# Patient Record
Sex: MALE | Race: WHITE | NOT HISPANIC OR LATINO | Employment: UNEMPLOYED | URBAN - NONMETROPOLITAN AREA
[De-identification: names, ages, dates, MRNs, and addresses within clinical notes are randomized per-mention and may not be internally consistent; named-entity substitution may affect disease eponyms.]

---

## 2021-01-24 ENCOUNTER — HISTORICAL (OUTPATIENT)
Dept: ADMINISTRATIVE | Facility: HOSPITAL | Age: 56
End: 2021-01-24

## 2021-06-24 ENCOUNTER — HISTORICAL (OUTPATIENT)
Dept: RADIOLOGY | Facility: HOSPITAL | Age: 56
End: 2021-06-24

## 2022-09-14 ENCOUNTER — HOSPITAL ENCOUNTER (EMERGENCY)
Facility: HOSPITAL | Age: 57
Discharge: HOME | End: 2022-09-15
Attending: INTERNAL MEDICINE
Payer: MEDICAID

## 2022-09-14 DIAGNOSIS — R60.0 BILATERAL LOWER EXTREMITY EDEMA: ICD-10-CM

## 2022-09-14 DIAGNOSIS — F19.10 POLYSUBSTANCE ABUSE: ICD-10-CM

## 2022-09-14 DIAGNOSIS — R60.0 PERIPHERAL EDEMA: Primary | ICD-10-CM

## 2022-09-14 DIAGNOSIS — M79.89 LEG SWELLING: ICD-10-CM

## 2022-09-14 PROCEDURE — 99283 EMERGENCY DEPT VISIT LOW MDM: CPT | Mod: 25

## 2022-09-15 VITALS
HEIGHT: 73 IN | OXYGEN SATURATION: 99 % | DIASTOLIC BLOOD PRESSURE: 92 MMHG | BODY MASS INDEX: 31.38 KG/M2 | HEART RATE: 82 BPM | RESPIRATION RATE: 18 BRPM | WEIGHT: 236.75 LBS | TEMPERATURE: 98 F | SYSTOLIC BLOOD PRESSURE: 144 MMHG

## 2022-09-15 LAB
ALBUMIN SERPL-MCNC: 3.5 GM/DL (ref 3.5–5)
ALBUMIN/GLOB SERPL: 0.9 RATIO (ref 1.1–2)
ALP SERPL-CCNC: 90 UNIT/L (ref 40–150)
ALT SERPL-CCNC: 29 UNIT/L (ref 0–55)
AMPHET UR QL SCN: NEGATIVE
APPEARANCE UR: CLEAR
AST SERPL-CCNC: 25 UNIT/L (ref 5–34)
BARBITURATE SCN PRESENT UR: NEGATIVE
BASOPHILS # BLD AUTO: 0.03 X10(3)/MCL (ref 0–0.2)
BASOPHILS NFR BLD AUTO: 0.4 %
BENZODIAZ UR QL SCN: NEGATIVE
BILIRUB UR QL STRIP.AUTO: NEGATIVE MG/DL
BILIRUBIN DIRECT+TOT PNL SERPL-MCNC: 0.4 MG/DL
BNP BLD-MCNC: <10 PG/ML
BUN SERPL-MCNC: 10 MG/DL (ref 8.4–25.7)
CALCIUM SERPL-MCNC: 9.2 MG/DL (ref 8.4–10.2)
CANNABINOIDS UR QL SCN: POSITIVE
CHLORIDE SERPL-SCNC: 104 MMOL/L (ref 98–107)
CO2 SERPL-SCNC: 21 MMOL/L (ref 22–29)
COCAINE UR QL SCN: POSITIVE
COLOR UR AUTO: ABNORMAL
CREAT SERPL-MCNC: 0.72 MG/DL (ref 0.73–1.18)
EOSINOPHIL # BLD AUTO: 0.53 X10(3)/MCL (ref 0–0.9)
EOSINOPHIL NFR BLD AUTO: 7.5 %
ERYTHROCYTE [DISTWIDTH] IN BLOOD BY AUTOMATED COUNT: 13 % (ref 11.5–17)
FENTANYL UR QL SCN: POSITIVE
GFR SERPLBLD CREATININE-BSD FMLA CKD-EPI: >60 MLS/MIN/1.73/M2
GLOBULIN SER-MCNC: 3.9 GM/DL (ref 2.4–3.5)
GLUCOSE SERPL-MCNC: 111 MG/DL (ref 74–100)
GLUCOSE UR QL STRIP.AUTO: NEGATIVE MG/DL
HCT VFR BLD AUTO: 41.4 % (ref 42–52)
HGB BLD-MCNC: 13.7 GM/DL (ref 14–18)
IMM GRANULOCYTES # BLD AUTO: 0.02 X10(3)/MCL (ref 0–0.04)
IMM GRANULOCYTES NFR BLD AUTO: 0.3 %
KETONES UR QL STRIP.AUTO: NEGATIVE MG/DL
LEUKOCYTE ESTERASE UR QL STRIP.AUTO: NEGATIVE UNIT/L
LYMPHOCYTES # BLD AUTO: 1.52 X10(3)/MCL (ref 0.6–4.6)
LYMPHOCYTES NFR BLD AUTO: 21.5 %
MCH RBC QN AUTO: 31.3 PG (ref 27–31)
MCHC RBC AUTO-ENTMCNC: 33.1 MG/DL (ref 33–36)
MCV RBC AUTO: 94.5 FL (ref 80–94)
MDMA UR QL SCN: NEGATIVE
MONOCYTES # BLD AUTO: 0.83 X10(3)/MCL (ref 0.1–1.3)
MONOCYTES NFR BLD AUTO: 11.7 %
NEUTROPHILS # BLD AUTO: 4.1 X10(3)/MCL (ref 2.1–9.2)
NEUTROPHILS NFR BLD AUTO: 58.6 %
NITRITE UR QL STRIP.AUTO: NEGATIVE
OPIATES UR QL SCN: POSITIVE
PCP UR QL: NEGATIVE
PH UR STRIP.AUTO: 6 [PH]
PH UR: 6 [PH] (ref 3–11)
PLATELET # BLD AUTO: 303 X10(3)/MCL (ref 130–400)
PMV BLD AUTO: 10.4 FL (ref 7.4–10.4)
POTASSIUM SERPL-SCNC: 4.1 MMOL/L (ref 3.5–5.1)
PROT SERPL-MCNC: 7.4 GM/DL (ref 6.4–8.3)
PROT UR QL STRIP.AUTO: NEGATIVE MG/DL
RBC # BLD AUTO: 4.38 X10(6)/MCL (ref 4.7–6.1)
RBC UR QL AUTO: NEGATIVE UNIT/L
SODIUM SERPL-SCNC: 136 MMOL/L (ref 136–145)
SP GR UR STRIP.AUTO: >=1.03
SPECIFIC GRAVITY, URINE AUTO (.000) (OHS): >=1.03 (ref 1–1.03)
UROBILINOGEN UR STRIP-ACNC: 2 MG/DL
WBC # SPEC AUTO: 7.1 X10(3)/MCL (ref 4.5–11.5)

## 2022-09-15 PROCEDURE — 81003 URINALYSIS AUTO W/O SCOPE: CPT | Mod: 59 | Performed by: INTERNAL MEDICINE

## 2022-09-15 PROCEDURE — 83880 ASSAY OF NATRIURETIC PEPTIDE: CPT | Performed by: INTERNAL MEDICINE

## 2022-09-15 PROCEDURE — 36415 COLL VENOUS BLD VENIPUNCTURE: CPT | Performed by: INTERNAL MEDICINE

## 2022-09-15 PROCEDURE — 80307 DRUG TEST PRSMV CHEM ANLYZR: CPT | Performed by: INTERNAL MEDICINE

## 2022-09-15 PROCEDURE — 80053 COMPREHEN METABOLIC PANEL: CPT | Performed by: INTERNAL MEDICINE

## 2022-09-15 PROCEDURE — 85025 COMPLETE CBC W/AUTO DIFF WBC: CPT | Performed by: INTERNAL MEDICINE

## 2022-09-15 NOTE — ED PROVIDER NOTES
Encounter Date: 9/14/2022       History     Chief Complaint   Patient presents with    Leg Swelling     Pt c/o catrina lower leg pain and swelling x 10 days.     57-year-old white male reports that since he got out of [facility] a couple of weeks ago his legs began to swell and no matter what he does he continues to have swelling in both lower extremities.    Review of patient's allergies indicates:  No Known Allergies  History reviewed. No pertinent past medical history.  No past surgical history on file.  History reviewed. No pertinent family history.     Review of Systems   Constitutional: Negative.  Negative for activity change, appetite change, chills, diaphoresis, fatigue, fever and unexpected weight change.   HENT: Negative.  Negative for congestion, dental problem, drooling, ear discharge, ear pain, facial swelling, hearing loss, mouth sores, nosebleeds, postnasal drip, rhinorrhea, sinus pressure, sinus pain, sneezing, sore throat, tinnitus, trouble swallowing and voice change.    Eyes: Negative.  Negative for photophobia, pain, discharge, redness, itching and visual disturbance.   Respiratory: Negative.  Negative for apnea, cough, choking, chest tightness, shortness of breath, wheezing and stridor.    Cardiovascular:  Positive for leg swelling. Negative for chest pain and palpitations.   Gastrointestinal: Negative.  Negative for abdominal distention, abdominal pain, anal bleeding, blood in stool, constipation, diarrhea, nausea, rectal pain and vomiting.   Endocrine: Negative.  Negative for cold intolerance, heat intolerance, polydipsia, polyphagia and polyuria.   Genitourinary: Negative.  Negative for decreased urine volume, difficulty urinating, dysuria, enuresis, flank pain, frequency, genital sores, hematuria, penile discharge, penile pain, penile swelling, scrotal swelling, testicular pain and urgency.   Musculoskeletal: Negative.  Negative for arthralgias, back pain, gait problem, joint swelling, myalgias,  neck pain and neck stiffness.   Skin: Negative.  Negative for color change, pallor, rash and wound.   Allergic/Immunologic: Negative.  Negative for environmental allergies, food allergies and immunocompromised state.   Neurological: Negative.  Negative for dizziness, tremors, seizures, syncope, facial asymmetry, speech difficulty, weakness, light-headedness, numbness and headaches.   Hematological: Negative.  Negative for adenopathy. Does not bruise/bleed easily.   Psychiatric/Behavioral: Negative.  Negative for agitation, behavioral problems, confusion, decreased concentration, dysphoric mood, hallucinations, self-injury, sleep disturbance and suicidal ideas. The patient is not nervous/anxious and is not hyperactive.    All other systems reviewed and are negative.    Physical Exam     Initial Vitals [09/14/22 2348]   BP Pulse Resp Temp SpO2   (!) 144/92 98 20 98 °F (36.7 °C) 98 %      MAP       --         Physical Exam    Nursing note and vitals reviewed.  Constitutional: He appears well-developed and well-nourished.   HENT:   Head: Normocephalic and atraumatic.   Eyes: Conjunctivae and EOM are normal. Pupils are equal, round, and reactive to light.   Neck: Neck supple.   Normal range of motion.  Cardiovascular:  Normal rate and regular rhythm.           1+ distal edema with hair loss and skin changes in the pretibial area down to the ankle consistent with chronic venous stasis   Pulmonary/Chest: Breath sounds normal.   Abdominal: Abdomen is soft. Bowel sounds are normal.   Musculoskeletal:         General: Edema present. Normal range of motion.      Cervical back: Normal range of motion and neck supple.     Neurological: He is alert and oriented to person, place, and time.   Skin: Skin is warm and dry. Capillary refill takes less than 2 seconds.   Psychiatric: He has a normal mood and affect. His behavior is normal. Judgment and thought content normal.       ED Course   Procedures  Labs Reviewed   COMPREHENSIVE  METABOLIC PANEL - Abnormal; Notable for the following components:       Result Value    Carbon Dioxide 21 (*)     Glucose Level 111 (*)     Creatinine 0.72 (*)     Globulin 3.9 (*)     Albumin/Globulin Ratio 0.9 (*)     All other components within normal limits   DRUG SCREEN, URINE (BEAKER) - Abnormal; Notable for the following components:    Cannabinoids, Urine Positive (*)     Cocaine, Urine Positive (*)     Fentanyl, Urine Positive (*)     Opiates, Urine Positive (*)     All other components within normal limits    Narrative:     Cut off concentrations:    Amphetamines - 1000 ng/ml  Barbiturates - 200 ng/ml  Benzodiazepine - 200 ng/ml  Cannabinoids (THC) - 50 ng/ml  Cocaine - 300 ng/ml  Fentanyl - 1.0 ng/ml  MDMA - 500 ng/ml  Opiates - 300 ng/ml   Phencyclidine (PCP) - 25 ng/ml    Specimen submitted for drug analysis and tested for pH and specific gravity in order to evaluate sample integrity. Suspect tampering if specific gravity is <1.003 and/or pH is not within the range of 4.5 - 8.0  False negatives may result form substances such as bleach added to urine.  False positives may result for the presence of a substance with similar chemical structure to the drug or its metabolite.    This test provides only a PRELIMINARY analytical test result. A more specific alternate chemical method must be used in order to obtain a confirmed analytical result. Gas chromatography/mass spectrometry (GC/MS) is the preferred confirmatory method. Other chemical confirmation methods are available. Clinical consideration and professional judgement should be applied to any drug of abuse test result, particularly when preliminary positive results are used.    Positive results will be confirmed only at the physicians request. Unconfirmed screening results are to be used only for medical purposes (treatment).        URINALYSIS, REFLEX TO URINE CULTURE - Abnormal; Notable for the following components:    Color, UA Dark Yellow (*)      Urobilinogen, UA 2.0 (*)     All other components within normal limits   CBC WITH DIFFERENTIAL - Abnormal; Notable for the following components:    RBC 4.38 (*)     Hgb 13.7 (*)     Hct 41.4 (*)     MCV 94.5 (*)     MCH 31.3 (*)     All other components within normal limits   B-TYPE NATRIURETIC PEPTIDE - Normal   CBC W/ AUTO DIFFERENTIAL    Narrative:     The following orders were created for panel order CBC auto differential.  Procedure                               Abnormality         Status                     ---------                               -----------         ------                     CBC with Differential[[id number]]        Abnormal            Final result                 Please view results for these tests on the individual orders.          Imaging Results    None          Medications - No data to display              ED Course as of 09/15/22 0131   Thu Sep 15, 2022   0129 Discussed findings with him and his friend in the room and following a low-salt diet originally he states he does not eat salt however when I asked him what he ate yesterday it was fast food from [employer] in the morning lunch and dinner time.  He states since [facility] where there was no salt on his food at all he has been eating a lot of fast food and I explained to him that this is contributing to his peripheral edema, that and his polysubstance abuse [PL]      ED Course User Index  [PL] Blair Cooper MD                 Clinical Impression:   Final diagnoses:  [R60.0] Bilateral lower extremity edema  [M79.89] Leg swelling  [R60.9] Peripheral edema (Primary)  [F19.10] Polysubstance abuse      ED Disposition Condition    Discharge Stable          ED Prescriptions    None       Follow-up Information       Follow up With Specialties Details Why Contact Info    Primary care physician  In 3 days            Radha Acuna is a certified MA and was present during the entire interaction with this patient        Blair Cooper MD  09/15/22 0139

## 2024-07-27 ENCOUNTER — HOSPITAL ENCOUNTER (EMERGENCY)
Facility: HOSPITAL | Age: 59
Discharge: HOME | End: 2024-07-27
Attending: EMERGENCY MEDICINE
Payer: MEDICAID

## 2024-07-27 VITALS
BODY MASS INDEX: 24.49 KG/M2 | RESPIRATION RATE: 16 BRPM | SYSTOLIC BLOOD PRESSURE: 113 MMHG | WEIGHT: 197 LBS | HEIGHT: 75 IN | HEART RATE: 72 BPM | DIASTOLIC BLOOD PRESSURE: 72 MMHG | TEMPERATURE: 98 F | OXYGEN SATURATION: 95 %

## 2024-07-27 DIAGNOSIS — S62.629B OPEN AVULSION FRACTURE OF MIDDLE PHALANX OF FINGER, INITIAL ENCOUNTER: ICD-10-CM

## 2024-07-27 DIAGNOSIS — S61.212A LACERATION OF RIGHT MIDDLE FINGER WITHOUT FOREIGN BODY WITHOUT DAMAGE TO NAIL, INITIAL ENCOUNTER: Primary | ICD-10-CM

## 2024-07-27 PROCEDURE — 99284 EMERGENCY DEPT VISIT MOD MDM: CPT | Mod: 25

## 2024-07-27 PROCEDURE — 90471 IMMUNIZATION ADMIN: CPT | Performed by: PHYSICIAN ASSISTANT

## 2024-07-27 PROCEDURE — 63600175 PHARM REV CODE 636 W HCPCS: Performed by: PHYSICIAN ASSISTANT

## 2024-07-27 PROCEDURE — 96372 THER/PROPH/DIAG INJ SC/IM: CPT | Performed by: PHYSICIAN ASSISTANT

## 2024-07-27 PROCEDURE — 25000003 PHARM REV CODE 250: Performed by: PHYSICIAN ASSISTANT

## 2024-07-27 PROCEDURE — 12041 INTMD RPR N-HF/GENIT 2.5CM/<: CPT

## 2024-07-27 PROCEDURE — 90715 TDAP VACCINE 7 YRS/> IM: CPT | Performed by: PHYSICIAN ASSISTANT

## 2024-07-27 RX ORDER — KETOROLAC TROMETHAMINE 10 MG/1
10 TABLET, FILM COATED ORAL EVERY 6 HOURS
Qty: 20 TABLET | Refills: 0 | Status: SHIPPED | OUTPATIENT
Start: 2024-07-27 | End: 2024-08-01

## 2024-07-27 RX ORDER — KETOROLAC TROMETHAMINE 30 MG/ML
15 INJECTION, SOLUTION INTRAMUSCULAR; INTRAVENOUS
Status: DISCONTINUED | OUTPATIENT
Start: 2024-07-27 | End: 2024-07-27

## 2024-07-27 RX ORDER — CEPHALEXIN 500 MG/1
500 CAPSULE ORAL 4 TIMES DAILY
Qty: 20 CAPSULE | Refills: 0 | Status: SHIPPED | OUTPATIENT
Start: 2024-07-27 | End: 2024-08-01

## 2024-07-27 RX ORDER — MUPIROCIN 20 MG/G
OINTMENT TOPICAL DAILY
Qty: 22 G | Refills: 0 | Status: SHIPPED | OUTPATIENT
Start: 2024-07-27 | End: 2024-08-03

## 2024-07-27 RX ORDER — CEFAZOLIN SODIUM 1 G/3ML
1 INJECTION, POWDER, FOR SOLUTION INTRAMUSCULAR; INTRAVENOUS
Status: COMPLETED | OUTPATIENT
Start: 2024-07-27 | End: 2024-07-27

## 2024-07-27 RX ORDER — LIDOCAINE HYDROCHLORIDE 10 MG/ML
5 INJECTION, SOLUTION EPIDURAL; INFILTRATION; INTRACAUDAL; PERINEURAL
Status: COMPLETED | OUTPATIENT
Start: 2024-07-27 | End: 2024-07-27

## 2024-07-27 RX ADMIN — TETANUS TOXOID, REDUCED DIPHTHERIA TOXOID AND ACELLULAR PERTUSSIS VACCINE, ADSORBED 0.5 ML: 5; 2.5; 8; 8; 2.5 SUSPENSION INTRAMUSCULAR at 12:07

## 2024-07-27 RX ADMIN — CEFAZOLIN 1 G: 330 INJECTION, POWDER, FOR SOLUTION INTRAMUSCULAR; INTRAVENOUS at 12:07

## 2024-07-27 RX ADMIN — LIDOCAINE HYDROCHLORIDE 50 MG: 10 INJECTION, SOLUTION EPIDURAL; INFILTRATION; INTRACAUDAL; PERINEURAL at 12:07

## 2024-12-26 DIAGNOSIS — M25.512 LEFT SHOULDER PAIN: ICD-10-CM

## 2024-12-26 DIAGNOSIS — M25.511 RIGHT SHOULDER PAIN: Primary | ICD-10-CM

## 2025-01-06 PROBLEM — Z12.11 ENCOUNTER FOR SCREENING COLONOSCOPY: Status: ACTIVE | Noted: 2025-01-06

## 2025-01-06 NOTE — H&P (VIEW-ONLY)
"History & Physical    Subjective     History of Present Illness:  Patient is a 59 y.o. male referred by primary care physician for evaluation with screening colonoscopy.  Patient currently has 1 bowel movement per  2 day of normal caliber and size without noticing shrinkage or tapering.  Currently denying abdominal pain or cramping.  Denies unintentional weight loss.  Denies noticing blood in the stools.  Has no family history colon cancer.  Previous surgical history of admission as below  Chief Complaint   Patient presents with    Colon Cancer Screening     Referral from JESSICA Kruger for c-scope       Review of patient's allergies indicates:  No Known Allergies    Current Outpatient Medications   Medication Sig Dispense Refill    predniSONE (DELTASONE) 20 MG tablet Take 20 mg by mouth 2 (two) times daily. (Patient not taking: Reported on 1/6/2025)       No current facility-administered medications for this visit.       Past Medical History:   Diagnosis Date    Colon cancer screening     Hypertension     Lower back pain      Past Surgical History:   Procedure Laterality Date    HERNIA REPAIR  07/29/2016    Low back surgery  02/26/2015    Reconstructive face surgery  08/12/2016    Dr Kenna Hammond    Right third digit finger bone chip removed Right 07/27/2024    Dr Tito Love     Family History   Problem Relation Name Age of Onset    Heart disease Mother      Heart disease Father      Stroke Father      Other (abdominal aneurysm) Father       Social History     Tobacco Use    Smoking status: Every Day     Types: Cigarettes    Smokeless tobacco: Never   Substance Use Topics    Alcohol use: Yes    Drug use: Never        Review of Systems:  Review of Systems   All other systems reviewed and are negative.         Objective     Vital Signs (Most Recent)  Temp: 97.1 °F (36.2 °C) (01/06/25 1119)  Pulse: 82 (01/06/25 1119)  Resp: 20 (01/06/25 1119)  BP: 118/72 (01/06/25 1119)  SpO2: 97 % (01/06/25 1119)  6' 3" (1.905 " m)  93.8 kg (206 lb 12.8 oz)     Physical Exam:  Physical Exam  Vitals reviewed.   Constitutional:       Appearance: Normal appearance.   HENT:      Head: Normocephalic and atraumatic.      Nose: Nose normal.   Eyes:      Extraocular Movements: Extraocular movements intact.      Pupils: Pupils are equal, round, and reactive to light.   Cardiovascular:      Rate and Rhythm: Normal rate and regular rhythm.   Pulmonary:      Effort: Pulmonary effort is normal.      Breath sounds: Normal breath sounds.   Abdominal:      General: Abdomen is flat. Bowel sounds are normal.      Palpations: Abdomen is soft.   Genitourinary:     Penis: Normal.    Musculoskeletal:         General: Normal range of motion.      Cervical back: Neck supple.   Skin:     General: Skin is warm and dry.   Neurological:      General: No focal deficit present.      Mental Status: He is alert.         Laboratory  None    Diagnostic Results:  None       Assessment and Plan   59-year-old white male in need of 1st age-appropriate screening colonoscopy no family history colorectal cancer    PLAN:  Consent for colonoscopy has been obtained after risks benefits and alternatives to procedure explained and all questions answered.  Colonoscopy plan for 01/28/2025

## 2025-01-15 ENCOUNTER — HOSPITAL ENCOUNTER (OUTPATIENT)
Dept: RADIOLOGY | Facility: HOSPITAL | Age: 60
Discharge: HOME | End: 2025-01-15
Payer: MEDICAID

## 2025-01-15 DIAGNOSIS — M25.511 RIGHT SHOULDER PAIN: ICD-10-CM

## 2025-01-15 DIAGNOSIS — M25.512 LEFT SHOULDER PAIN: ICD-10-CM

## 2025-01-15 PROCEDURE — 73221 MRI JOINT UPR EXTREM W/O DYE: CPT | Mod: TC,RT

## 2025-01-27 ENCOUNTER — HOSPITAL ENCOUNTER (OUTPATIENT)
Dept: RADIOLOGY | Facility: HOSPITAL | Age: 60
Discharge: HOME | End: 2025-01-27
Payer: MEDICAID

## 2025-01-27 ENCOUNTER — ANESTHESIA EVENT (OUTPATIENT)
Dept: SURGERY | Facility: HOSPITAL | Age: 60
End: 2025-01-27
Payer: MEDICAID

## 2025-01-27 PROCEDURE — 73221 MRI JOINT UPR EXTREM W/O DYE: CPT | Mod: TC,LT

## 2025-01-27 NOTE — ANESTHESIA PREPROCEDURE EVALUATION
01/27/2025  Bryce Beckham is a 59 y.o., male.      Pre-op Assessment    I have reviewed the Patient Summary Reports.     I have reviewed the Nursing Notes. I have reviewed the NPO Status.   I have reviewed the Medications.     Review of Systems  Anesthesia Hx:             Denies Family Hx of Anesthesia complications.    Denies Personal Hx of Anesthesia complications.                    Social:  Smoker, Alcohol Use       Hematology/Oncology:  Hematology Normal   Oncology Normal                                   EENT/Dental:  EENT/Dental Normal           Cardiovascular:     Hypertension, well controlled                                          Pulmonary:  Pulmonary Normal                       Renal/:  Renal/ Normal                 Hepatic/GI:  Hepatic/GI Normal                    Musculoskeletal:  Musculoskeletal Normal                Neurological:  Neurology Normal                                      Endocrine:  Endocrine Normal            Dermatological:  Skin Normal    Psych:  Psychiatric Normal                    Physical Exam  General: Cooperative, Alert and Oriented    Airway:  Mallampati: II   Mouth Opening: Normal  TM Distance: Normal  Tongue: Normal  Neck ROM: Normal ROM    Dental:  Intact        Anesthesia Plan  Type of Anesthesia, risks & benefits discussed:    Anesthesia Type: Gen Natural Airway  Intra-op Monitoring Plan: Standard ASA Monitors  Post Op Pain Control Plan:   (medical reason for not using multimodal pain management)  Induction:  IV  Informed Consent: Informed consent signed with the Patient and all parties understand the risks and agree with anesthesia plan.  All questions answered. Patient consented to blood products? Yes  ASA Score: 3    Ready For Surgery From Anesthesia Perspective.     .

## 2025-01-28 ENCOUNTER — ANESTHESIA (OUTPATIENT)
Dept: SURGERY | Facility: HOSPITAL | Age: 60
End: 2025-01-28
Payer: MEDICAID

## 2025-02-03 ENCOUNTER — CLINICAL SUPPORT (OUTPATIENT)
Dept: RESPIRATORY THERAPY | Facility: HOSPITAL | Age: 60
End: 2025-02-03
Attending: ANESTHESIOLOGY
Payer: MEDICAID

## 2025-02-03 DIAGNOSIS — Z01.818 PREOP EXAMINATION: Primary | ICD-10-CM

## 2025-02-03 DIAGNOSIS — Z01.818 PREOP EXAMINATION: ICD-10-CM

## 2025-02-03 LAB
OHS QRS DURATION: 108 MS
OHS QTC CALCULATION: 432 MS

## 2025-02-03 PROCEDURE — 93005 ELECTROCARDIOGRAM TRACING: CPT

## 2025-02-03 PROCEDURE — 93010 ELECTROCARDIOGRAM REPORT: CPT | Mod: ,,, | Performed by: INTERNAL MEDICINE

## 2025-02-03 NOTE — DISCHARGE INSTRUCTIONS
Follow prep on Monday. Clear liquids only. Nothing by mouth after midnight.         INSTRUCTIONS  AFTER A COLONOSCOPY/EGD    NO DRIVING X 24 HOURS. NOTIFY YOUR DOCTOR WITH     ABDOMINAL PAIN UNRELIEVED BY  PASSING GAS,   FEVER WITHIN 24 HOURS, OR LARGE AMOUNT OF BLEEDING.

## 2025-02-04 ENCOUNTER — HOSPITAL ENCOUNTER (OUTPATIENT)
Facility: HOSPITAL | Age: 60
Discharge: HOME | End: 2025-02-04
Attending: SURGERY | Admitting: SURGERY
Payer: MEDICAID

## 2025-02-04 VITALS
RESPIRATION RATE: 18 BRPM | DIASTOLIC BLOOD PRESSURE: 68 MMHG | TEMPERATURE: 97 F | OXYGEN SATURATION: 97 % | HEART RATE: 79 BPM | HEIGHT: 75 IN | BODY MASS INDEX: 25.71 KG/M2 | SYSTOLIC BLOOD PRESSURE: 105 MMHG | WEIGHT: 206.81 LBS

## 2025-02-04 DIAGNOSIS — Z12.11 COLON CANCER SCREENING: ICD-10-CM

## 2025-02-04 DIAGNOSIS — Z12.11 ENCOUNTER FOR SCREENING COLONOSCOPY: Primary | ICD-10-CM

## 2025-02-04 PROCEDURE — 37000009 HC ANESTHESIA EA ADD 15 MINS: Performed by: SURGERY

## 2025-02-04 PROCEDURE — 63600175 PHARM REV CODE 636 W HCPCS: Performed by: NURSE ANESTHETIST, CERTIFIED REGISTERED

## 2025-02-04 PROCEDURE — 25000003 PHARM REV CODE 250: Performed by: NURSE ANESTHETIST, CERTIFIED REGISTERED

## 2025-02-04 PROCEDURE — D9220A PRA ANESTHESIA: Mod: ,,, | Performed by: NURSE ANESTHETIST, CERTIFIED REGISTERED

## 2025-02-04 PROCEDURE — 45378 DIAGNOSTIC COLONOSCOPY: CPT | Mod: 74 | Performed by: SURGERY

## 2025-02-04 PROCEDURE — A4216 STERILE WATER/SALINE, 10 ML: HCPCS | Performed by: NURSE ANESTHETIST, CERTIFIED REGISTERED

## 2025-02-04 PROCEDURE — 37000008 HC ANESTHESIA 1ST 15 MINUTES: Performed by: SURGERY

## 2025-02-04 RX ORDER — SODIUM CHLORIDE 9 MG/ML
INJECTION, SOLUTION INTRAVENOUS CONTINUOUS
Status: DISCONTINUED | OUTPATIENT
Start: 2025-02-04 | End: 2025-02-04 | Stop reason: HOSPADM

## 2025-02-04 RX ORDER — PROPOFOL 10 MG/ML
VIAL (ML) INTRAVENOUS
Status: DISCONTINUED | OUTPATIENT
Start: 2025-02-04 | End: 2025-02-04

## 2025-02-04 RX ORDER — SODIUM CHLORIDE 0.9 % (FLUSH) 0.9 %
SYRINGE (ML) INJECTION
Status: DISCONTINUED | OUTPATIENT
Start: 2025-02-04 | End: 2025-02-04

## 2025-02-04 RX ORDER — ONDANSETRON HYDROCHLORIDE 2 MG/ML
4 INJECTION, SOLUTION INTRAVENOUS EVERY 12 HOURS PRN
Status: DISCONTINUED | OUTPATIENT
Start: 2025-02-04 | End: 2025-02-04 | Stop reason: HOSPADM

## 2025-02-04 RX ORDER — LIDOCAINE HYDROCHLORIDE 20 MG/ML
INJECTION, SOLUTION EPIDURAL; INFILTRATION; INTRACAUDAL; PERINEURAL
Status: DISCONTINUED | OUTPATIENT
Start: 2025-02-04 | End: 2025-02-04

## 2025-02-04 RX ADMIN — PROPOFOL 150 MG: 10 INJECTION, EMULSION INTRAVENOUS at 09:02

## 2025-02-04 RX ADMIN — PROPOFOL 30 MG: 10 INJECTION, EMULSION INTRAVENOUS at 09:02

## 2025-02-04 RX ADMIN — SODIUM CHLORIDE, PRESERVATIVE FREE 10 ML: 5 INJECTION INTRAVENOUS at 09:02

## 2025-02-04 RX ADMIN — PROPOFOL 50 MG: 10 INJECTION, EMULSION INTRAVENOUS at 09:02

## 2025-02-04 RX ADMIN — LIDOCAINE HYDROCHLORIDE 60 MG: 20 INJECTION, SOLUTION EPIDURAL; INFILTRATION; INTRACAUDAL; PERINEURAL at 09:02

## 2025-02-04 NOTE — ANESTHESIA POSTPROCEDURE EVALUATION
Anesthesia Post Evaluation    Patient: Bryce Beckham    Procedure(s) Performed: Procedure(s) (LRB):  COLONOSCOPY (N/A)    Final Anesthesia Type: general      Patient participation: Yes- Able to Participate  Level of consciousness: awake and alert and oriented  Post-procedure vital signs: reviewed and stable  Pain management: adequate  Airway patency: patent    PONV status at discharge: No PONV  Anesthetic complications: no      Cardiovascular status: stable  Respiratory status: unassisted, spontaneous ventilation and room air  Hydration status: euvolemic  Follow-up not needed.                Taken Time     02/04/25 0930     02/04/25 0930     02/04/25 0930     02/04/25 0930     02/04/25 0930         No case tracking events are documented in the log.      Pain/Elsie Score: No data recorded

## 2025-02-04 NOTE — OP NOTE
Procedure date: 02/04/2025    Indications: 60 y.o. male In need of  screening colonoscopy with no family history of colon cancer  Preoperative diagnosis:  Age-appropriate screening colonoscopy    Postoperative diagnosis:  Incomplete colonoscopy due to poor bowel prep    Procedure performed:  Sigmoidoscopy    Procedure in detail:  Patient was brought to the endoscopy suite laid in a left lateral decubitus position right side up.  Intravenous anesthesia was provided.  Digital rectal exam performed exhibiting good anorectal tone and no masses.  The endoscope was then passed through the anus intubating the rectum and with gentle insufflation reaching the region of the transverse colon during advancement of the scope there was copious amounts of thickened liquid stool which was encountered.  This made visualization and further advancement very difficult.  Attempts were made at irrigating and suctioning the stools for appropriate visualization however continuously clogged the scope.  Due to inability to completely visualize the colon we will withdrew the scope and discontinued the procedure.  During retrieval of the scope of the regions of the colon which reviewed within the descending and rectosigmoid region appeared to be grossly normal.  Scope was then retroflexed in the distal rectum revealing normal-appearing perianal mucosa and small internal hemorrhoids.  It was then returned to neutral position and the colon was evacuated of air.  The patient was then relieved of anesthesia stable condition and transferred to postanesthesia care unit.    Specimens:  None    Findings:  Poor bowel prep with otherwise normal descending and rectosigmoid region of the colon    Complications:  None    Recommendations:  Repeat colonoscopy at a _1_ year interval versus repeating colonoscopy with 2 day bowel prep    Disposition:  Upon recovery from anesthesia patient will be discharged to home with a follow up with__ primary care physician  ___________.    Reyna Rivera MD

## 2025-03-14 ENCOUNTER — HOSPITAL ENCOUNTER (OUTPATIENT)
Dept: RADIOLOGY | Facility: HOSPITAL | Age: 60
Discharge: HOME | End: 2025-03-14
Payer: MEDICAID

## 2025-03-14 DIAGNOSIS — M54.9 DORSALGIA: ICD-10-CM

## 2025-03-14 PROCEDURE — 72114 X-RAY EXAM L-S SPINE BENDING: CPT | Mod: TC

## 2025-05-06 DIAGNOSIS — M25.512 SHOULDER PAIN, BILATERAL: Primary | ICD-10-CM

## 2025-05-06 DIAGNOSIS — M25.511 SHOULDER PAIN, BILATERAL: Primary | ICD-10-CM

## 2025-06-09 NOTE — PROGRESS NOTES
"Subjective:   Patient ID: Bryce Beckham is a right handed 60 y.o. male  who presented to [facility] for new visit.    Chief Complaint: Pain of the Left Shoulder and Pain of the Right Shoulder    History of Present Illness:  Bryce Beckham presents to the clinic today for bilateral shoulder pain (L>R) for the past 1 year. Pain is located at deltoid muscle, globally. Pain is 8/10 bilaterally, Quality of pain is described as aching, dull, and throbbing.  Radiates to arm. Inciting event: none known. Pain is aggravated by all activities, lifting, ADL's, work at or above shoulder height, difficulty sleeping on affected side. Night pain yes and if sleeps on affected side. Patient has had no prior shoulder problems. Evaluation to date: plain films, MRI, and PCP evaluation. Treatment to date: avoidance of activity, topical analgesics, oral analgesics, and PT (has gone to 2 session in the last month with increase in pain). Expectations for today's visit: further evaluation. Occupation: former  and . PCP: BARBRA Kebede.    Shoulder Review of Systems:  Swelling?  no  Instability?  yes  Clicking?  yes  Limited ROM? yes  Fever/Chills? no  Subluxation? yes  Dislocation? no    Comorbid Conditions:  Overweight  Current smoker (0.5-1PPD)    Objective:     Physical Exam:  /76   Pulse 89   Temp 98.3 °F (36.8 °C) (Oral)   Resp 18   Ht 6' 3" (1.905 m)   Wt 93 kg (205 lb)   SpO2 98%   BMI 25.62 kg/m²     Appearance:  Soft tissue swelling: Left: no Right: no  Effusion: Left:  Negative Right: Negative  Erythema: Left no Right: no  Ecchymosis: Left: no Right: no  Atrophy: Left: yes Right: yes  Scapular winging: Left: no Right: no    Palpation:  Shoulder Tenderness: Left: globally  Right: globally    Range of motion:  Flexion (0-90): Left:  70 Right: 70  Abduction (0-180): Left:  70 Right: 70  External rotation (0-55): Left: 35 Right: " 35  Internal rotation (0-45): Left: 30 Right: 30    Strength:  Abduction: Left: 4/5 Pain: yes Right: 4/5 Pain: yes  External rotation: Left: 4/5 Pain: yes Right: 4/5 Pain: yes  Internal rotation: Left: 4/5 Pain: yes Right: 4/5 Pain: yes  Elbow flexion: Left: 5/5 Pain: no Right: 5/5 Pain: no  Elbow extension: Left: 5/5 Pain: no Right: 5/5 Pain: no    Special Tests:  Subacromial Impingement  Neer: Left: Positive Right: Positive  Huggins: Left: Positive Right: Positive    AC Joint Arthritis:  Cross-body abduction: Left: Positive Right: Positive    Rotator Cuff Tear   Drop arm test: Left: Not performed Right: Not performed  Hornblower: Left: Left: Not performed Right: Not performed   Belly press test: Left: Positive Right: Positive  Deng test (Empty can): Left: Positive Right: Positive    Stability   Sulcus sign: Left: Positive Right: Positive   Apprehension test: Left: Positive Right: Positive   Relocation test: Left: Not performed Right: Not performed     Cervical   Spurling: Left: Negative Right: Negative    AIN/PIN/Ulnar nerve: Intact and symmetric    General appearance: NAD  Peripheral pulses: normal bilaterally   Reflexes: Left: Not performed Right: Not performed   Sensation: normal    Labs:  Last A1c: The patient doesn't have any registry metric data available     Imaging:   Previous images reviewed.  X-rays ordered and performed today: yes  # of views: 4 Laterality: bilateral  My Interpretation:  Extensive GH joint narrowing with collapse of the humeral head, osteophyte formation, subchondral cysts.  AC joint narrowing.    Left Shoulder MRI on 1/27/25, My Interpretation: subchondral collapse of the humeral head, subchondral cyst change, degenerative change of the labrum, GH joint effusion present, tenosynovitis of long head of the biceps    Right Shoulder MRI on 1/15/25, My Interpretation: subchondral cystic change, shallow glenoid, large GH effusion with synovitis    Assessment:     Encounter Diagnoses   Code  Name Primary?    M19.011, M19.012 Osteoarthritis of bilateral glenohumeral joints Yes       Plan:      MDM: Prior external referring provider notes reviewed. Prior external referring provider studies reviewed.   Dx:  Bilateral primary GH osteoarthritis- New problem  Treatment Plan: Discussed with patient diagnosis and treatment recommendations.  Discussed with the patient conservative treatment options.  He is currently in PT but is having trouble with increase in pain during his sessions.  He has attempted topical and oral analgesics without much relief.  He has also had both oral and IM steroids without improvement.  Reviewing the patient's XR and MRI today recommend further surgical evaluation to bilateral shoulders.  Patient agrees with plan.  Follow up with Orthopedic surgery next available.  Imaging: radiological studies ordered and independently reviewed; discussed with patient; pending radiologist interpretation.   Procedure: Discussed injection as a treatment option; recommend surgical evaluation, if patient has not a candidate or decides not to proceed with surgical intervention may consider intra-articular CSI.  Therapy: Physical Therapy  Medication: CONTINUE over-the-counter acetaminophen (Tylenol 1000 mg three times per day as needed)  CONTINUE Voltaren Gel 1% as prescribed  CONTINUE over-the-counter NSAIDs (ibuprofen 200mg three tablets three times a day as needed). Please see your primary care physician for further refills.  RTC:  PRN, follow up with Orthopedic surgery for surgical evaluation.       This note is dictated using the M*Modal Fluency Direct word recognition program. There are word recognition mistakes that are occasionally missed on review.     Janice Marin MD  Sports Medicine Fellow

## 2025-06-10 ENCOUNTER — OFFICE VISIT (OUTPATIENT)
Dept: ORTHOPEDICS | Facility: CLINIC | Age: 60
End: 2025-06-10
Payer: MEDICAID

## 2025-06-10 ENCOUNTER — HOSPITAL ENCOUNTER (OUTPATIENT)
Dept: RADIOLOGY | Facility: HOSPITAL | Age: 60
Discharge: HOME | End: 2025-06-10
Payer: MEDICAID

## 2025-06-10 VITALS
OXYGEN SATURATION: 98 % | DIASTOLIC BLOOD PRESSURE: 76 MMHG | BODY MASS INDEX: 25.49 KG/M2 | HEART RATE: 89 BPM | TEMPERATURE: 98 F | SYSTOLIC BLOOD PRESSURE: 112 MMHG | RESPIRATION RATE: 18 BRPM | WEIGHT: 205 LBS | HEIGHT: 75 IN

## 2025-06-10 DIAGNOSIS — M25.511 SHOULDER PAIN, BILATERAL: ICD-10-CM

## 2025-06-10 DIAGNOSIS — M25.512 SHOULDER PAIN, BILATERAL: ICD-10-CM

## 2025-06-10 DIAGNOSIS — M19.012 OSTEOARTHRITIS OF BILATERAL GLENOHUMERAL JOINTS: Primary | ICD-10-CM

## 2025-06-10 DIAGNOSIS — M19.011 OSTEOARTHRITIS OF BILATERAL GLENOHUMERAL JOINTS: Primary | ICD-10-CM

## 2025-06-10 PROCEDURE — 99214 OFFICE O/P EST MOD 30 MIN: CPT | Mod: PBBFAC,25

## 2025-06-10 PROCEDURE — 73030 X-RAY EXAM OF SHOULDER: CPT | Mod: TC,RT

## 2025-06-10 PROCEDURE — 73030 X-RAY EXAM OF SHOULDER: CPT | Mod: TC,LT

## 2025-06-10 RX ORDER — POLYETHYLENE GLYCOL-3350 AND ELECTROLYTES 236; 6.74; 5.86; 2.97; 22.74 G/274.31G; G/274.31G; G/274.31G; G/274.31G; G/274.31G
POWDER, FOR SOLUTION ORAL
COMMUNITY
Start: 2025-02-03

## 2025-06-10 RX ORDER — MELOXICAM 15 MG/1
15 TABLET ORAL
COMMUNITY
Start: 2025-03-11

## 2025-06-10 RX ORDER — CYCLOBENZAPRINE HCL 5 MG
5 TABLET ORAL 2 TIMES DAILY
COMMUNITY
Start: 2025-04-29

## 2025-06-10 NOTE — PROGRESS NOTES
Faculty Attestation: Bryce MARCELO Beckham  was seen in [facility] Medicine Clinic Discussed with Dr. Marin at the time of the visit. History of Present Illness, Physical Exam, and Assessment and Plan reviewed.     Treatment plan is reasonable and appropriate. Compliance with treatment recommendations is important.      Radiology images independently reviewed and agree with fellow interpretation.     No procedure was performed.    Reyes Garcia MD  Sports Medicine

## 2025-07-09 ENCOUNTER — CLINICAL SUPPORT (OUTPATIENT)
Dept: ORTHOPEDICS | Facility: CLINIC | Age: 60
End: 2025-07-09
Payer: MEDICAID

## 2025-07-09 VITALS
BODY MASS INDEX: 26.29 KG/M2 | HEIGHT: 75 IN | SYSTOLIC BLOOD PRESSURE: 104 MMHG | WEIGHT: 211.44 LBS | HEART RATE: 76 BPM | DIASTOLIC BLOOD PRESSURE: 67 MMHG | TEMPERATURE: 98 F

## 2025-07-09 DIAGNOSIS — M19.011 OSTEOARTHRITIS OF BILATERAL GLENOHUMERAL JOINTS: Primary | ICD-10-CM

## 2025-07-09 DIAGNOSIS — M19.012 OSTEOARTHRITIS OF BILATERAL GLENOHUMERAL JOINTS: Primary | ICD-10-CM

## 2025-07-09 RX ORDER — DULOXETIN HYDROCHLORIDE 30 MG/1
30 CAPSULE, DELAYED RELEASE ORAL DAILY
COMMUNITY
Start: 2025-07-03

## 2025-07-12 NOTE — PROGRESS NOTES
The patient had to leave the office urgently due to his mother being airlifted to a hospital. He was not seen.     Signed,    Olivia Gomes MD, ATC  [employer] [facility], PGY-3

## 2025-08-06 ENCOUNTER — CLINICAL SUPPORT (OUTPATIENT)
Dept: ORTHOPEDICS | Facility: CLINIC | Age: 60
End: 2025-08-06
Payer: MEDICAID

## 2025-08-06 VITALS
WEIGHT: 207.69 LBS | OXYGEN SATURATION: 95 % | TEMPERATURE: 98 F | SYSTOLIC BLOOD PRESSURE: 127 MMHG | HEIGHT: 76 IN | BODY MASS INDEX: 25.29 KG/M2 | DIASTOLIC BLOOD PRESSURE: 83 MMHG | HEART RATE: 85 BPM

## 2025-08-06 DIAGNOSIS — M19.012 OSTEOARTHRITIS OF BILATERAL GLENOHUMERAL JOINTS: Primary | ICD-10-CM

## 2025-08-06 DIAGNOSIS — M19.011 OSTEOARTHRITIS OF BILATERAL GLENOHUMERAL JOINTS: Primary | ICD-10-CM

## 2025-08-06 PROCEDURE — 99215 OFFICE O/P EST HI 40 MIN: CPT | Mod: PBBFAC

## 2025-08-06 RX ORDER — SODIUM CHLORIDE 9 MG/ML
INJECTION, SOLUTION INTRAVENOUS CONTINUOUS
OUTPATIENT
Start: 2025-08-06

## 2025-08-06 RX ORDER — MUPIROCIN 20 MG/G
OINTMENT TOPICAL
OUTPATIENT
Start: 2025-08-06

## 2025-08-06 RX ORDER — CEFAZOLIN SODIUM 2 G/50ML
2 SOLUTION INTRAVENOUS
OUTPATIENT
Start: 2025-08-06

## 2025-08-06 NOTE — PROGRESS NOTES
[facility] Progress Note     In brief, 60 y.o. male with bilateral glenohumeral joint arthritis     HPI:   60-year-old right-hand dominant male with bilateral glenohumeral joint arthritis.  He has had no injury or trauma to his shoulders.  He has never had infections in his shoulders.  He has had chronic pain for years now.  Does not take anything other than an occasional meloxicam for the pain.  He has also tried physical therapy.  He has tried topical analgesics.  He has not had any injections.    It has no medical problems and takes no medications.  He does have a PCP. BARBRA Kebede.   Currently on disability.  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  He does not drink alcohol every day    PMH:   Past Medical History:   Diagnosis Date    Arthritis     Colon cancer screening     Hypertension     Lower back pain        PSH:   Past Surgical History:   Procedure Laterality Date    COLONOSCOPY N/A 2/4/2025    Procedure: COLONOSCOPY;  Surgeon: Reyna Rivera MD;  Location: [facility];  Service: Endoscopy;  Laterality: N/A;  POOR PREP    HERNIA REPAIR  07/29/2016    Low back surgery  02/26/2015    Reconstructive face surgery  08/12/2016    Dr Kenna Hammond    Right third digit finger bone chip removed Right 07/27/2024    Dr Tito Love       SH: Social History[1]    FH:   Family History   Problem Relation Name Age of Onset    Heart disease Mother      Heart disease Father      Stroke Father      Other (abdominal aneurysm) Father         Allergies: Review of patient's allergies indicates:  No Known Allergies     Physical Exam:    Vitals:    08/06/25 1333   BP: 127/83   Pulse: 85   Temp: 97.9 °F (36.6 °C)       General: NAD  Cardio: Regular rate by peripheral pulse   Pulm: Normal WOB on room air, symmetric chest expansion  Abd: Soft, NT/ND  Psych: normal affect/mood  Neuro: A&O    Bilateral shoulders:   Skin benign   No focal  tenderness to palpation   Motor intact ain/pin/M/U/R  Shoulder abduction / FF intact : axillary nerve intact  Range of motion: 80° forward flexion, 80° abduction, 20° external rotation, internal rotation to L5  Pain with any range of motion of the shoulder  2+radial pulse    Imaging:  XR bilateral shoulders:  Severe glenohumeral joint arthritis    MRI left shoulder:  Advanced glenohumeral joint arthritis with cystic changes of both the glenoid and the humeral head.  Supraspinatus/infraspinatus/subscapularis intact.  Significant fluid around the long head of the biceps tendon     Assessment/Plan:  60 year old right-hand dominant male with bilateral severe glenohumeral joint arthritis.  Plan for left total shoulder replacement with Dr. Byrd 9/28/2025  PCP clearance needed  Smoking cessation   Follow up for surgery    Senthil Melara MD  [facility], PGY-5  8/6/2025 2:11 PM         [1]   Social History  Socioeconomic History    Marital status:    Tobacco Use    Smoking status: Every Day     Current packs/day: 0.25     Average packs/day: 1.5 packs/day for 40.6 years (60.7 ttl pk-yrs)     Types: Cigarettes     Start date: 1985    Smokeless tobacco: Never    Tobacco comments:     1 Pack every 3 days   Substance and Sexual Activity    Alcohol use: Not Currently    Drug use: Not Currently     Types: Fentanyl     Comment: Fentanyl    Sexual activity: Not Currently

## 2025-08-06 NOTE — LETTER
[facility]  [address] [address]  Phone: [phone]       Bryce MARCELO Beckhma   1965              Surgery Date 08/28/2025 08/06/2025     How to Prepare for Surgery (Orthopedic)  About this topic   There are some things that are common for most kinds of surgery. These help to keep you safe. Learn what you can do to get ready for your surgery. This will help you and the team caring for you during your surgery. Also, learn about the things the doctor and nurses do to keep you safe during surgery.  You may have outpatient surgery where you come in the day of your surgery and then go home hours after your surgery.  In other cases, you may be admitted to the hospital the day before your surgery, or you may have to stay in the hospital after your surgery.  Who will contact you before your surgery   Anesthesiology may call you to speak with you about the surgery and to see if you ever had any issues with anesthesiology before.   Preop nurse will call to go over your medical history with you 5 - 7 days before your surgery.  Their number is [phone].   Daytime surgery will call the day before between 3 pm and 6 pm to give you the time to arrive for your surgery. Their number is [phone].   General   Weeks before your surgery:  These are things you can do to lower your chances of having problems after your surgery:  Stop smoking if you are a smoker. Avoid being around others who smoke for several weeks before and after your surgery. Smoking limits how much oxygen gets to your body for healing.  If you have diabetes, keep your blood sugars as near normal as you can. This helps lower your chance of infection or other problems after your surgery.  Talk to your doctor about all the drugs you take. This includes over-the-counter drugs, natural products, and vitamins.  There may be some you can take the day of your surgery. If you have diabetes, talk to your doctor about  your drugs, especially if you are on insulin. Your doctor may tell you to take less than usual on the day of your surgery.  If you take insulin or any diabetic medication do not take the morning of surgery. __________________________________________________________________________________  Clearance that is needed _________________________________________________________  Your doctor may want you to stop taking some of your drugs before surgery. Some drugs and natural products make it harder for your blood to clot. Then, you may have more bleeding during or after surgery. Be sure to tell your doctor if you are taking any drugs that may cause bleeding.   Common Medication Perioperative Consideration Stop prior to surgery   Aspirin Risk for Bleeding 7 days   NSAID's (antiinflammatories) Examples: Mobic/Meloxicam  Advil/Motrin/Ibuprofen/Aleve  Naproixen/Naprosyn  Voltaren/Diclofenac/Arthotec  /Celebrex/Celecoxib  /Toradol/Ketorolac  Ralafen/Nabumetone/Arthotec Risk for Bleeding 7 days   Phentermine/Diet Pills  Anesthesia, BP, Cardiac Side Effects 7 days   GIP/Glp-1 Agonist  Examples: Trulicity (Dulaglutide) Ozempic/Wegovy (Semaglutide) Victoza/Saxenda (liraglutide) Mounjaro (Trizepatide) Delayed Gastric Emptying causing Increase risk of aspiration in surgery 14 days   Omega-3/Fish oil Increase risk for bleeding 7 days    Medication that  need to be stopped ____________________________________________________________________________________________________________________________________________________________________________________________  Herbal Supplements you should stop     Herbal Supplement Operative Concerns Stop before Surgery   Echinacea Can be associated with allergic reaction 7 days   Ephedra (Mahung) Increase blood pressure and heart rate with anesthesia 7 days   All Vitamins, CBD supplement, Garlic, Paulina, Tumeric,Umary Increase risk of bleeding 7 days   Gingko Biloba Increase risk of bleeding 7 days    Ginseng Low Glucose, decrease effects of coumadin 7 days   Kava Increased sedation with anesthesia  7 days   Bibiana Wort Multiple drug interaction with coumadin, steroids, and some heart medication 7 days   Valerian Increase sedation Taper dose  14 days before surgery   Phentermine Cardiac side effects 7 days   Fenfluramine (Phen Phen) Cardiac side effects 7 days      Have the tests done that your doctor orders before your surgery. Your doctor may want you to have lab tests, an x-ray, or EKG to see how healthy you are. Having these tests helps the doctor plan for your care during surgery.  Mild exercise like walking, riding a bike, or swimming may be good for you. It may help you breathe more easily before and after surgery. Ask your doctor if it is OK for you to exercise before surgery. Also, practice taking deep breaths. Often after surgery, you will be asked to take deep breaths and cough. This may help prevent lung infections.  Follow a healthy diet and eat nutritious, well-balanced meals. Eat a healthy meal for supper the day before your surgery. Avoid beer, wine, and mixed drinks (alcohol).  You will not be allowed to drive right away after surgery. Ask a family member or a friend to drive you home.  Most often your doctor will want you to have an adult stay with you for at least 24 hours. Arrange with family or friends to stay with you on the first day after your surgery or when you go home.  If you get sick with a cold, infection, or other illness in the 2 weeks before your surgery, call your doctor's office. You may need to change when you have surgery because you may be more at risk for infection or other problems.  Check your skin for rashes, cuts, insect bites, or any skin infections and report these to your doctor before surgery. Pay attention to the areas that you cannot see easily, such as the bottoms of your feet and back. Check in skin folds for any bumps or skin rashes.  Shaving should be  stopped at least 2 days before surgery on all areas of the body including the face, legs, underarms, etc.   Day before and morning of your surgery:  Daytime surgery will call the day before between 3 pm and 6 pm to give you the time to arrive for your surgery. Their number is [phone].    Wash your hair and take a bath or shower before your surgery (around 7 pm). The doctor may give you special soap or wipes to wash with before the surgery to lessen the germs on your skin. Follow the directions how to use this special soap or wipes provided by your doctor. Your skin should be completely dry and cool. When applied to sensitive skin, the cloths may irritate the skin such as temporary itching sensation and /or redness.  If itching or redness persists, rinse affected area and discontinue use.  Clean skin in the following order using one cloth for each step.   AVOID CONTACT WITH EYES, EARS, MOUTH,AND MUCOUS MEMBRANES (VAGINAL OR RECTAL).                     YOU MUST USE ALL OF THE CLOTHS  Cloth #1  Wipe YOUR Neck and chest.  Cloth #2  Wipe both arms to fingertips and both armpits.  Cloth #3  Wipe both hips followed by groin area. Be sure to wipe the folds of your stomach and groin.  DO NOT use on vaginal and rectal areas.  Cloth #4  Wipe both legs starting at the thigh and ending at the toes.  Cloth #5  Wipe your back.  Cloth #6  Wipe your buttocks.  Do not use body lotions, perfumes, powders, or creams on your skin, especially near the surgical site. Do not wear makeup, especially eye makeup.  You will also need to take off nail polish and jewelry. Remove any jewelry from body piercings.  Stop eating and drinking at the time you are told to. This helps to make sure that your stomach is empty while you are under anesthesia.  Brush your teeth before your surgery. Take extra care not to drink any water while you brush. If your child is having surgery, watch that your child does not drink any water while they  brush.  Leave all valuables and jewelry at home.  What to expect when you arrive for surgery:  At the hospital or surgery center, the staff will work to get you ready for your surgery. Here are some of the things that will happen before you get to the operating room:  You will have a bracelet that has your name, birth date, and other information on it. Staff may check this bracelet often or ask you your name and birthdate. This is a safety check to make sure they have the right patient.  If you have allergies, you may have to wear a bracelet with them listed on it. You may also have a bracelet to tell staff that you are at a higher risk of falling.  If you have a history of sleep apnea and use a CPAP machine for sleep, please bring the CPAP with you if you are spending the night after your surgery.  You will change out of your clothing and wear a hospital gown. You will need to take off your glasses and remove contact lenses, hearing aids, and dentures before your surgery.  The staff will:  Give you warm blankets or use a warming blanket so you are not cold.  Take your temperature and blood pressure. They may also ask about or check your height and weight.  Ask questions about your health history and allergies. You may have to tell this information to others as well. This is another safety check.  Put an IV in your hand or arm to give you fluids and drugs. You may be given a drug to make you sleepy.  The staff may:  Give you a special mouthwash to use. This helps lower the number of germs in your mouth.  Put special stockings or boots on your legs and feet to help with blood flow.  Use clippers to remove hair around the area of your surgical cut, depending on the site of your surgery.  The anesthesia team will:  Ask about your history and allergies.  Ask you to sign a consent after they speak with you about their anesthesia plan for you.  Want to know if you have any loose teeth before your surgery.  Talk with you  about your surgery and what they will do to keep you comfortable during surgery.  Have you meet people who will be in the operating room with you.  Your doctor will:  Talk with you about your surgery and the risks and benefits. Be sure to ask any questions you may have. You may need to sign a consent form if you have not already done so.  Talk with you about the possible need for blood products. You may be asked to sign a consent for blood products as well.  Sign or ryan the part of your body where you will be having surgery. This is a safety check.    What will the results be?   You will be ready for your surgery.  What drugs may be needed?   The doctor may order drugs before your surgery to:  Help with fear or worry and help you to relax  Prevent infection  Prevent blood clots  The doctor may order drugs after surgery to:  Help lessen pain  Help prevent or lessen upset stomach  Prevent infection  When do I need to call the doctor?   Signs of infection. These include a fever of 100.4°F (38°C) or higher, chills.  If you have a cough, cold, fever, or become ill a few days before you are scheduled to have surgery. Your doctor may want to reschedule it.  If you do not have a ride to and from your surgery  If you have any skin rashes, cuts, boils, or infections on your skin. Your doctor may want to reschedule the surgery since this can put you at risk for wound infection after your surgery.  Helpful tips   Wear loose clothing and comfortable shoes that are easy to put on and take off.  Remove all body piercings before you come for your surgery.  Bring these things with you to the hospital:  Your insurance card and photo ID  A copy of your advance directive if you have one  A list of all drugs that you take and the amounts that you take  A list of all your allergies and the kind of reaction they cause you  Make sure you will be able to move about your home easily after your surgery. You may need extra pillows or a  recliner to rest in.  Have foods in your home that will be easy on your belly after surgery. You may want things like juices, crackers, soups, and Jello.

## 2025-08-06 NOTE — PROGRESS NOTES
Faculty Attestation: Bryce MARCELO Link  was seen at [facility] in the Orthopaedic Clinic in the outpatient department at a [facility]. Patient seen and evaluated at time of visit. I agree with resident's assessment and plan.  I participated in the management of the patient and was immediately available throughout the encounter. History of Present Illness, Physical Exam, and Assessment and Plan reviewed. Treatment plan is reasonable and appropriate. Compliance with treatment recommendations is important. No procedures were performed.     Ricky Becerra MD  [facility] Orthopedic Surgery Chief

## 2025-08-20 ENCOUNTER — ANESTHESIA EVENT (OUTPATIENT)
Dept: SURGERY | Facility: HOSPITAL | Age: 60
End: 2025-08-20
Payer: MEDICAID

## 2025-08-25 DIAGNOSIS — G89.29 CHRONIC LEFT SHOULDER PAIN: Primary | ICD-10-CM

## 2025-08-25 DIAGNOSIS — M25.512 CHRONIC LEFT SHOULDER PAIN: Primary | ICD-10-CM

## 2025-08-27 RX ORDER — ACETAMINOPHEN 500 MG
1000 TABLET ORAL EVERY 8 HOURS
Qty: 90 TABLET | Refills: 0 | Status: SHIPPED | OUTPATIENT
Start: 2025-08-27

## 2025-08-27 RX ORDER — ONDANSETRON 4 MG/1
4 TABLET, FILM COATED ORAL EVERY 8 HOURS PRN
Qty: 15 TABLET | Refills: 0 | Status: SHIPPED | OUTPATIENT
Start: 2025-08-27 | End: 2025-09-03

## 2025-08-27 RX ORDER — OXYCODONE HYDROCHLORIDE 5 MG/1
5 TABLET ORAL EVERY 6 HOURS PRN
Qty: 28 TABLET | Refills: 0 | Status: SHIPPED | OUTPATIENT
Start: 2025-08-27

## 2025-08-27 RX ORDER — MELOXICAM 15 MG/1
15 TABLET ORAL DAILY
Qty: 14 TABLET | Refills: 0 | Status: SHIPPED | OUTPATIENT
Start: 2025-08-27

## 2025-08-27 RX ORDER — GABAPENTIN 300 MG/1
300 CAPSULE ORAL 3 TIMES DAILY
Qty: 90 CAPSULE | Refills: 0 | Status: SHIPPED | OUTPATIENT
Start: 2025-08-27 | End: 2025-09-26

## 2025-08-28 ENCOUNTER — ANESTHESIA (OUTPATIENT)
Dept: SURGERY | Facility: HOSPITAL | Age: 60
End: 2025-08-28
Payer: MEDICAID

## 2025-08-28 ENCOUNTER — HOSPITAL ENCOUNTER (OUTPATIENT)
Facility: HOSPITAL | Age: 60
Discharge: HOME | End: 2025-08-28
Attending: ORTHOPAEDIC SURGERY | Admitting: ORTHOPAEDIC SURGERY
Payer: MEDICAID

## 2025-08-28 DIAGNOSIS — M19.011 OSTEOARTHRITIS OF BILATERAL GLENOHUMERAL JOINTS: ICD-10-CM

## 2025-08-28 DIAGNOSIS — M19.012 ARTHRITIS OF LEFT GLENOHUMERAL JOINT: ICD-10-CM

## 2025-08-28 DIAGNOSIS — Z12.11 ENCOUNTER FOR SCREENING COLONOSCOPY: Primary | ICD-10-CM

## 2025-08-28 DIAGNOSIS — M19.012 OSTEOARTHRITIS OF BILATERAL GLENOHUMERAL JOINTS: ICD-10-CM

## 2025-08-28 PROCEDURE — C1713 ANCHOR/SCREW BN/BN,TIS/BN: HCPCS | Performed by: ORTHOPAEDIC SURGERY

## 2025-08-28 PROCEDURE — 37000008 HC ANESTHESIA 1ST 15 MINUTES: Performed by: ORTHOPAEDIC SURGERY

## 2025-08-28 PROCEDURE — 25000003 PHARM REV CODE 250: Performed by: NURSE ANESTHETIST, CERTIFIED REGISTERED

## 2025-08-28 PROCEDURE — 71000016 HC POSTOP RECOV ADDL HR: Performed by: ORTHOPAEDIC SURGERY

## 2025-08-28 PROCEDURE — 37000009 HC ANESTHESIA EA ADD 15 MINS: Performed by: ORTHOPAEDIC SURGERY

## 2025-08-28 PROCEDURE — 63600175 PHARM REV CODE 636 W HCPCS: Performed by: NURSE ANESTHETIST, CERTIFIED REGISTERED

## 2025-08-28 PROCEDURE — 63600175 PHARM REV CODE 636 W HCPCS: Performed by: ORTHOPAEDIC SURGERY

## 2025-08-28 PROCEDURE — 36000711: Performed by: ORTHOPAEDIC SURGERY

## 2025-08-28 PROCEDURE — 71000015 HC POSTOP RECOV 1ST HR: Performed by: ORTHOPAEDIC SURGERY

## 2025-08-28 PROCEDURE — 27201423 OPTIME MED/SURG SUP & DEVICES STERILE SUPPLY: Performed by: ORTHOPAEDIC SURGERY

## 2025-08-28 PROCEDURE — 63600175 PHARM REV CODE 636 W HCPCS: Performed by: ANESTHESIOLOGY

## 2025-08-28 PROCEDURE — 23472 RECONSTRUCT SHOULDER JOINT: CPT | Mod: LT,,, | Performed by: ORTHOPAEDIC SURGERY

## 2025-08-28 PROCEDURE — C1776 JOINT DEVICE (IMPLANTABLE): HCPCS | Performed by: ORTHOPAEDIC SURGERY

## 2025-08-28 PROCEDURE — 63600175 PHARM REV CODE 636 W HCPCS: Performed by: STUDENT IN AN ORGANIZED HEALTH CARE EDUCATION/TRAINING PROGRAM

## 2025-08-28 PROCEDURE — 64415 NJX AA&/STRD BRCH PLXS IMG: CPT | Performed by: ANESTHESIOLOGY

## 2025-08-28 PROCEDURE — 36000710: Performed by: ORTHOPAEDIC SURGERY

## 2025-08-28 PROCEDURE — C1889 IMPLANT/INSERT DEVICE, NOC: HCPCS | Performed by: ORTHOPAEDIC SURGERY

## 2025-08-28 PROCEDURE — 71000033 HC RECOVERY, INTIAL HOUR: Performed by: ORTHOPAEDIC SURGERY

## 2025-08-28 DEVICE — PIN FIXATION REV SHOULDER 9: Type: IMPLANTABLE DEVICE | Site: SHOULDER | Status: FUNCTIONAL

## 2025-08-28 DEVICE — IMPLANTABLE DEVICE: Type: IMPLANTABLE DEVICE | Site: SHOULDER | Status: FUNCTIONAL

## 2025-08-28 DEVICE — CEMENT BONE ANTIBIO SIMPLEX P: Type: IMPLANTABLE DEVICE | Site: SHOULDER | Status: FUNCTIONAL

## 2025-08-28 DEVICE — HUMERAL HEAD STAND TAPER TI: Type: IMPLANTABLE DEVICE | Site: SHOULDER | Status: FUNCTIONAL

## 2025-08-28 RX ORDER — HYDROMORPHONE HYDROCHLORIDE 1 MG/ML
0.5 INJECTION, SOLUTION INTRAMUSCULAR; INTRAVENOUS; SUBCUTANEOUS EVERY 10 MIN PRN
Status: DISCONTINUED | OUTPATIENT
Start: 2025-08-28 | End: 2025-08-28 | Stop reason: HOSPADM

## 2025-08-28 RX ORDER — PROPOFOL 10 MG/ML
VIAL (ML) INTRAVENOUS
Status: DISCONTINUED | OUTPATIENT
Start: 2025-08-28 | End: 2025-08-28

## 2025-08-28 RX ORDER — GLUCAGON 1 MG
1 KIT INJECTION
Status: DISCONTINUED | OUTPATIENT
Start: 2025-08-28 | End: 2025-08-28 | Stop reason: HOSPADM

## 2025-08-28 RX ORDER — MORPHINE SULFATE 10 MG/ML
10 INJECTION INTRAMUSCULAR; INTRAVENOUS; SUBCUTANEOUS ONCE
Refills: 0 | Status: DISCONTINUED | OUTPATIENT
Start: 2025-08-28 | End: 2025-08-28 | Stop reason: HOSPADM

## 2025-08-28 RX ORDER — BUPIVACAINE HYDROCHLORIDE 5 MG/ML
INJECTION, SOLUTION EPIDURAL; INTRACAUDAL; PERINEURAL
Status: DISCONTINUED | OUTPATIENT
Start: 2025-08-28 | End: 2025-08-28

## 2025-08-28 RX ORDER — LIDOCAINE HYDROCHLORIDE 20 MG/ML
INJECTION INTRAVENOUS
Status: DISCONTINUED | OUTPATIENT
Start: 2025-08-28 | End: 2025-08-28

## 2025-08-28 RX ORDER — KETOROLAC TROMETHAMINE 30 MG/ML
30 INJECTION, SOLUTION INTRAMUSCULAR; INTRAVENOUS ONCE AS NEEDED
Status: DISCONTINUED | OUTPATIENT
Start: 2025-08-28 | End: 2025-08-28 | Stop reason: HOSPADM

## 2025-08-28 RX ORDER — IPRATROPIUM BROMIDE AND ALBUTEROL SULFATE 2.5; .5 MG/3ML; MG/3ML
3 SOLUTION RESPIRATORY (INHALATION) ONCE AS NEEDED
Status: DISCONTINUED | OUTPATIENT
Start: 2025-08-28 | End: 2025-08-28 | Stop reason: HOSPADM

## 2025-08-28 RX ORDER — FENTANYL CITRATE 50 UG/ML
INJECTION, SOLUTION INTRAMUSCULAR; INTRAVENOUS
Status: DISCONTINUED | OUTPATIENT
Start: 2025-08-28 | End: 2025-08-28

## 2025-08-28 RX ORDER — DEXAMETHASONE SODIUM PHOSPHATE 4 MG/ML
INJECTION, SOLUTION INTRA-ARTICULAR; INTRALESIONAL; INTRAMUSCULAR; INTRAVENOUS; SOFT TISSUE
Status: DISCONTINUED | OUTPATIENT
Start: 2025-08-28 | End: 2025-08-28

## 2025-08-28 RX ORDER — MORPHINE SULFATE 10 MG/ML
INJECTION INTRAMUSCULAR; INTRAVENOUS; SUBCUTANEOUS
Status: DISCONTINUED | OUTPATIENT
Start: 2025-08-28 | End: 2025-08-28 | Stop reason: HOSPADM

## 2025-08-28 RX ORDER — PHENYLEPHRINE HYDROCHLORIDE 10 MG/ML
INJECTION INTRAVENOUS
Status: DISCONTINUED | OUTPATIENT
Start: 2025-08-28 | End: 2025-08-28

## 2025-08-28 RX ORDER — OXYCODONE AND ACETAMINOPHEN 5; 325 MG/1; MG/1
1 TABLET ORAL
Status: DISCONTINUED | OUTPATIENT
Start: 2025-08-28 | End: 2025-08-28 | Stop reason: HOSPADM

## 2025-08-28 RX ORDER — ONDANSETRON HYDROCHLORIDE 2 MG/ML
4 INJECTION, SOLUTION INTRAVENOUS ONCE AS NEEDED
Status: DISCONTINUED | OUTPATIENT
Start: 2025-08-28 | End: 2025-08-28 | Stop reason: HOSPADM

## 2025-08-28 RX ORDER — DIPHENHYDRAMINE HYDROCHLORIDE 50 MG/ML
12.5 INJECTION, SOLUTION INTRAMUSCULAR; INTRAVENOUS ONCE AS NEEDED
Status: DISCONTINUED | OUTPATIENT
Start: 2025-08-28 | End: 2025-08-28 | Stop reason: HOSPADM

## 2025-08-28 RX ORDER — ONDANSETRON HYDROCHLORIDE 2 MG/ML
INJECTION, SOLUTION INTRAVENOUS
Status: DISCONTINUED | OUTPATIENT
Start: 2025-08-28 | End: 2025-08-28

## 2025-08-28 RX ORDER — SODIUM CHLORIDE 9 MG/ML
INJECTION, SOLUTION INTRAVENOUS CONTINUOUS
Status: DISCONTINUED | OUTPATIENT
Start: 2025-08-28 | End: 2025-08-28 | Stop reason: HOSPADM

## 2025-08-28 RX ORDER — EPINEPHRINE 1 MG/ML
INJECTION, SOLUTION, CONCENTRATE INTRAVENOUS
Status: DISCONTINUED | OUTPATIENT
Start: 2025-08-28 | End: 2025-08-28 | Stop reason: HOSPADM

## 2025-08-28 RX ORDER — MIDAZOLAM HYDROCHLORIDE 2 MG/2ML
2 INJECTION, SOLUTION INTRAMUSCULAR; INTRAVENOUS ONCE
Status: COMPLETED | OUTPATIENT
Start: 2025-08-28 | End: 2025-08-28

## 2025-08-28 RX ORDER — ROCURONIUM BROMIDE 10 MG/ML
INJECTION, SOLUTION INTRAVENOUS
Status: DISCONTINUED | OUTPATIENT
Start: 2025-08-28 | End: 2025-08-28

## 2025-08-28 RX ORDER — ROPIVACAINE HYDROCHLORIDE 5 MG/ML
INJECTION, SOLUTION EPIDURAL; INFILTRATION; PERINEURAL
Status: COMPLETED | OUTPATIENT
Start: 2025-08-28 | End: 2025-08-28

## 2025-08-28 RX ORDER — MUPIROCIN 20 MG/G
OINTMENT TOPICAL
Status: DISCONTINUED | OUTPATIENT
Start: 2025-08-28 | End: 2025-08-28 | Stop reason: HOSPADM

## 2025-08-28 RX ORDER — SODIUM CHLORIDE, SODIUM LACTATE, POTASSIUM CHLORIDE, CALCIUM CHLORIDE 600; 310; 30; 20 MG/100ML; MG/100ML; MG/100ML; MG/100ML
125 INJECTION, SOLUTION INTRAVENOUS CONTINUOUS
Status: ACTIVE | OUTPATIENT
Start: 2025-08-28 | End: 2025-08-28

## 2025-08-28 RX ORDER — KETOROLAC TROMETHAMINE 30 MG/ML
INJECTION, SOLUTION INTRAMUSCULAR; INTRAVENOUS
Status: DISCONTINUED | OUTPATIENT
Start: 2025-08-28 | End: 2025-08-28 | Stop reason: HOSPADM

## 2025-08-28 RX ORDER — ROPIVACAINE HYDROCHLORIDE 5 MG/ML
INJECTION, SOLUTION EPIDURAL; INFILTRATION; PERINEURAL
Status: DISCONTINUED | OUTPATIENT
Start: 2025-08-28 | End: 2025-08-28 | Stop reason: HOSPADM

## 2025-08-28 RX ORDER — CEFAZOLIN SODIUM 1 G/3ML
2 INJECTION, POWDER, FOR SOLUTION INTRAMUSCULAR; INTRAVENOUS
Status: COMPLETED | OUTPATIENT
Start: 2025-08-28 | End: 2025-08-28

## 2025-08-28 RX ORDER — KETAMINE HCL IN 0.9 % NACL 50 MG/5 ML
SYRINGE (ML) INTRAVENOUS
Status: DISCONTINUED | OUTPATIENT
Start: 2025-08-28 | End: 2025-08-28

## 2025-08-28 RX ADMIN — FENTANYL CITRATE 50 MCG: 50 INJECTION INTRAMUSCULAR; INTRAVENOUS at 11:08

## 2025-08-28 RX ADMIN — ROCURONIUM BROMIDE 50 MG: 10 INJECTION INTRAVENOUS at 09:08

## 2025-08-28 RX ADMIN — SODIUM CHLORIDE, POTASSIUM CHLORIDE, SODIUM LACTATE AND CALCIUM CHLORIDE: 600; 310; 30; 20 INJECTION, SOLUTION INTRAVENOUS at 09:08

## 2025-08-28 RX ADMIN — DEXAMETHASONE SODIUM PHOSPHATE 8 MG: 4 INJECTION, SOLUTION INTRA-ARTICULAR; INTRALESIONAL; INTRAMUSCULAR; INTRAVENOUS; SOFT TISSUE at 09:08

## 2025-08-28 RX ADMIN — ROPIVACAINE HYDROCHLORIDE 30 ML: 5 INJECTION, SOLUTION EPIDURAL; INFILTRATION; PERINEURAL at 08:08

## 2025-08-28 RX ADMIN — Medication 30 MG: at 09:08

## 2025-08-28 RX ADMIN — LIDOCAINE HYDROCHLORIDE 100 MG: 20 INJECTION INTRAVENOUS at 09:08

## 2025-08-28 RX ADMIN — LIDOCAINE HYDROCHLORIDE 100 MG: 20 INJECTION INTRAVENOUS at 11:08

## 2025-08-28 RX ADMIN — PHENYLEPHRINE HYDROCHLORIDE 200 MCG: 10 INJECTION INTRAVENOUS at 09:08

## 2025-08-28 RX ADMIN — PROPOFOL 175 MG: 10 INJECTION, EMULSION INTRAVENOUS at 09:08

## 2025-08-28 RX ADMIN — PHENYLEPHRINE HYDROCHLORIDE 300 MCG: 10 INJECTION INTRAVENOUS at 11:08

## 2025-08-28 RX ADMIN — PHENYLEPHRINE HYDROCHLORIDE 300 MCG: 10 INJECTION INTRAVENOUS at 09:08

## 2025-08-28 RX ADMIN — CEFAZOLIN 2 G: 330 INJECTION, POWDER, FOR SOLUTION INTRAMUSCULAR; INTRAVENOUS at 09:08

## 2025-08-28 RX ADMIN — PHENYLEPHRINE HYDROCHLORIDE 200 MCG: 10 INJECTION INTRAVENOUS at 10:08

## 2025-08-28 RX ADMIN — ROCURONIUM BROMIDE 30 MG: 10 INJECTION INTRAVENOUS at 10:08

## 2025-08-28 RX ADMIN — FENTANYL CITRATE 50 MCG: 50 INJECTION INTRAMUSCULAR; INTRAVENOUS at 09:08

## 2025-08-28 RX ADMIN — MIDAZOLAM HYDROCHLORIDE 2 MG: 1 INJECTION, SOLUTION INTRAMUSCULAR; INTRAVENOUS at 08:08

## 2025-08-28 RX ADMIN — ONDANSETRON 4 MG: 2 INJECTION INTRAMUSCULAR; INTRAVENOUS at 11:08

## 2025-08-28 RX ADMIN — SUGAMMADEX 200 MG: 100 INJECTION, SOLUTION INTRAVENOUS at 11:08

## 2025-08-29 VITALS
SYSTOLIC BLOOD PRESSURE: 123 MMHG | DIASTOLIC BLOOD PRESSURE: 80 MMHG | HEART RATE: 78 BPM | TEMPERATURE: 97 F | BODY MASS INDEX: 25.26 KG/M2 | OXYGEN SATURATION: 98 % | RESPIRATION RATE: 20 BRPM | WEIGHT: 204.81 LBS

## 2025-09-02 ENCOUNTER — TELEPHONE (OUTPATIENT)
Facility: CLINIC | Age: 60
End: 2025-09-02
Payer: MEDICAID

## 2025-09-02 DIAGNOSIS — M19.012 OSTEOARTHRITIS OF BILATERAL GLENOHUMERAL JOINTS: Primary | ICD-10-CM

## 2025-09-02 DIAGNOSIS — M19.011 OSTEOARTHRITIS OF BILATERAL GLENOHUMERAL JOINTS: Primary | ICD-10-CM

## 2025-09-02 RX ORDER — OXYCODONE HYDROCHLORIDE 5 MG/1
10 TABLET ORAL EVERY 6 HOURS PRN
Qty: 56 TABLET | Refills: 0 | Status: SHIPPED | OUTPATIENT
Start: 2025-09-02

## (undated) DEVICE — NDL SAFETY 21G X 1 1/2 ECLPSE

## (undated) DEVICE — SUT MONO PLUS 2-0 CP-1 27IN

## (undated) DEVICE — SOL NORMAL USPCA 0.9%

## (undated) DEVICE — DRESSING TRANS 4X4 3/4

## (undated) DEVICE — SUT CTD VICRYL 1 UND BR CT

## (undated) DEVICE — Device

## (undated) DEVICE — TOWEL OR DISP STRL BLUE 4/PK

## (undated) DEVICE — GOWN POLY REINF X-LONG 2XL

## (undated) DEVICE — SUT SMARTLOOP WHITE USP 5

## (undated) DEVICE — SOL NACL IRR 1000ML BTL

## (undated) DEVICE — DRSNG POLYSKIN TRNSPAR 4X4.75

## (undated) DEVICE — ELECTRODE PATIENT RETURN DISP

## (undated) DEVICE — COVER MAYO STND XL 30X57IN

## (undated) DEVICE — KIT SURGICAL TURNOVER

## (undated) DEVICE — SLING SHOT II LARGE

## (undated) DEVICE — DRAPE SHOULDER BEACH CHAIR

## (undated) DEVICE — KIT IRR SUCTION HND PIECE

## (undated) DEVICE — RESTRAINT HEAD BCH CHR W/ CLIP

## (undated) DEVICE — DRAPE STERI U-SHAPED 47X51IN

## (undated) DEVICE — APPLICATOR CHLORAPREP ORN 26ML

## (undated) DEVICE — PEROXIDE HYDROGEN 3% 16OZ

## (undated) DEVICE — SUT SMARTLOOP GREEN USP 5

## (undated) DEVICE — SYR 50ML CATH TIP

## (undated) DEVICE — SYS IRRISEPT 450ML0.05% CHG

## (undated) DEVICE — CLOSURE SKIN STERI STRIP 1/2X4

## (undated) DEVICE — HOOD FLYTE SURGICOOL

## (undated) DEVICE — KIT TRIMANO

## (undated) DEVICE — BLADE TONGUE DEPRESSOR AD 6IN

## (undated) DEVICE — DRAPE U-DRAPE ADHESIVE 60X60IN

## (undated) DEVICE — COVER TABLE HVY DTY 60X90IN

## (undated) DEVICE — ELECTRODE NEEDLE 2.8IN

## (undated) DEVICE — KIT BASIC ORTHO UNIVERSITY

## (undated) DEVICE — DRAPE INCISE IOBAN 2 13X13IN

## (undated) DEVICE — RETRIEVER SUTURE HEWSON DISP

## (undated) DEVICE — SUT BLU BR 2 TAPERD NDL 1/2

## (undated) DEVICE — KIT SURGICAL COLON .25 1.1OZ

## (undated) DEVICE — DRAPE FULL SHEET 70X100IN

## (undated) DEVICE — SYR 30CC LUER LOCK